# Patient Record
Sex: FEMALE | Race: WHITE
[De-identification: names, ages, dates, MRNs, and addresses within clinical notes are randomized per-mention and may not be internally consistent; named-entity substitution may affect disease eponyms.]

---

## 2019-11-20 ENCOUNTER — HOSPITAL ENCOUNTER (OUTPATIENT)
Dept: HOSPITAL 75 - RAD FS | Age: 34
End: 2019-11-20
Attending: NURSE PRACTITIONER
Payer: COMMERCIAL

## 2019-11-20 DIAGNOSIS — S62.636A: Primary | ICD-10-CM

## 2019-11-20 PROCEDURE — 73130 X-RAY EXAM OF HAND: CPT

## 2019-11-20 NOTE — DIAGNOSTIC IMAGING REPORT
INDICATION: 

Right hand injury with pain.



TECHNIQUE: 

AP, oblique, and lateral views of the right hand were obtained.



FINDINGS:

There is a mildly displaced intra-articular avulsion fracture

along the dorsal aspect of the fifth distal phalanx. There is

mild dorsal displacement of the fracture fragment. No other

fracture or malalignment is identified.



IMPRESSION: 

Mildly displaced dorsal avulsion fracture involving the 5th

distal phalanx adjacent to the distal interphalangeal joint.



Dictated by: 



  Dictated on workstation # [id number]

## 2019-11-22 NOTE — HISTORY AND PHYSICAL
DATE OF SERVICE:  11/27/2019



ADMISSION HISTORY AND PHYSICAL



DATE OF ADMISSION:

11/27/2019.



This will be for outpatient surgery on 11/27/2019 for right small finger

percutaneous pin fixation.



HISTORY OF PRESENT ILLNESS:

The patient is a 34-year-old right hand dominant female, who has had her left

small finger stepped on approximately a week ago.  She was found to have an

avulsion fracture of the distal phalanx of the small finger consistent with a

bony mallet.  Due to the large size of the fracture, it was recommended that the

patient undergo operative fixation.



REVIEW OF SYSTEMS:

No chest pain, no shortness of breath and no dysuria.



PAST MEDICAL HISTORY:

Anxiety disorder and exercise induced asthma.



PAST SURGICAL HISTORY:

[place] tooth extraction.



FAMILY HISTORY:

Noncontributory.



PRIMARY CARE PROVIDER:

Dr. Matta.



MEDICATIONS:

Sertraline.



ALLERGIES:

No known drug allergies.



SOCIAL HISTORY:

The patient denies alcohol and tobacco use.



RADIOGRAPHS:

Reveal a bony mallet fracture of the distal phalanx involving approximately 50%

of the articular surface.



PHYSICAL EXAMINATION:

GENERAL:  The patient is well developed, well-nourished, in no acute distress.

HEENT:  Normocephalic, atraumatic.  Pupils are equal, round and reactive to

light.  Oropharynx is clear.

NECK:  Supple, no lymphadenopathy.

LUNGS:  Clear to auscultation bilaterally.

HEART:  Regular rate and rhythm.

ABDOMEN:  Soft, nontender and nondistended.

EXTREMITIES:  The right small finger demonstrates tenderness dorsally at her DIP

joint.  She has an extension lag.  Sensation is intact distally.  She has intact

FDS and FDP function.



IMPRESSION:

Bony mallet avulsion fracture of the small finger distal phalanx.



PLAN:

Closed reduction and percutaneous pin fixation of the right small finger.  The

risks, benefits, options, ramifications and recovery were discussed at length

with the patient.  She understands and wishes to proceed.





Job ID: [id number]

DocumentID: [id number]

Dictated Date:  11/22/2019 11:04:53

Transcription Date: 11/22/2019 11:37:53

Dictated By: DICK HULL MD

## 2019-11-25 ENCOUNTER — HOSPITAL ENCOUNTER (OUTPATIENT)
Dept: HOSPITAL 75 - PREOP | Age: 34
Discharge: HOME | End: 2019-11-25
Attending: ORTHOPAEDIC SURGERY
Payer: COMMERCIAL

## 2019-11-25 VITALS — WEIGHT: 290.57 LBS | HEIGHT: 67.99 IN | BODY MASS INDEX: 44.04 KG/M2

## 2019-11-25 DIAGNOSIS — Z01.818: Primary | ICD-10-CM

## 2019-11-27 ENCOUNTER — HOSPITAL ENCOUNTER (OUTPATIENT)
Dept: HOSPITAL 75 - SDC | Age: 34
Discharge: HOME | End: 2019-11-27
Attending: ORTHOPAEDIC SURGERY
Payer: COMMERCIAL

## 2019-11-27 VITALS — DIASTOLIC BLOOD PRESSURE: 89 MMHG | SYSTOLIC BLOOD PRESSURE: 138 MMHG

## 2019-11-27 VITALS — DIASTOLIC BLOOD PRESSURE: 81 MMHG | SYSTOLIC BLOOD PRESSURE: 132 MMHG

## 2019-11-27 VITALS — SYSTOLIC BLOOD PRESSURE: 123 MMHG | DIASTOLIC BLOOD PRESSURE: 75 MMHG

## 2019-11-27 VITALS — DIASTOLIC BLOOD PRESSURE: 87 MMHG | SYSTOLIC BLOOD PRESSURE: 137 MMHG

## 2019-11-27 VITALS — SYSTOLIC BLOOD PRESSURE: 109 MMHG | DIASTOLIC BLOOD PRESSURE: 64 MMHG

## 2019-11-27 VITALS — HEIGHT: 67.99 IN | BODY MASS INDEX: 44.04 KG/M2 | WEIGHT: 290.57 LBS

## 2019-11-27 VITALS — DIASTOLIC BLOOD PRESSURE: 80 MMHG | SYSTOLIC BLOOD PRESSURE: 127 MMHG

## 2019-11-27 VITALS — SYSTOLIC BLOOD PRESSURE: 134 MMHG | DIASTOLIC BLOOD PRESSURE: 85 MMHG

## 2019-11-27 VITALS — SYSTOLIC BLOOD PRESSURE: 139 MMHG | DIASTOLIC BLOOD PRESSURE: 80 MMHG

## 2019-11-27 VITALS — SYSTOLIC BLOOD PRESSURE: 143 MMHG | DIASTOLIC BLOOD PRESSURE: 96 MMHG

## 2019-11-27 DIAGNOSIS — F32.9: ICD-10-CM

## 2019-11-27 DIAGNOSIS — F41.9: ICD-10-CM

## 2019-11-27 DIAGNOSIS — E66.01: ICD-10-CM

## 2019-11-27 DIAGNOSIS — J45.998: ICD-10-CM

## 2019-11-27 DIAGNOSIS — M20.011: Primary | ICD-10-CM

## 2019-11-27 PROCEDURE — 84703 CHORIONIC GONADOTROPIN ASSAY: CPT

## 2019-11-27 PROCEDURE — 87081 CULTURE SCREEN ONLY: CPT

## 2019-11-27 NOTE — PROGRESS NOTE-PRE OPERATIVE
Pre-Operative Progress Note


H&P Reviewed


The H&P was reviewed, patient examined and no changes noted.


Date Seen by Provider:  Nov 27, 2019


Time Seen by Provider:  08:34


Date H&P Reviewed:  Nov 27, 2019


Time H&P Reviewed:  07:15


Pre-Operative Diagnosis:  right small finger bony mallet finger











DICK HULL MD            Nov 27, 2019 09:32


POS

## 2019-11-27 NOTE — DIAGNOSTIC IMAGING REPORT
Fluoroscopy.



Indication:  Finger pain.



Fluoroscopic assistance was provided for Dr. May. 19.2 seconds

of fluoroscopy time was utilized. AP and lateral spot films of

the 5th digit were obtained. The right hand exam performed

11/20/2019 did note a mildly displaced avulsion fracture

involving the dorsal aspect of the base of the distal phalanx of

the 5th digit. On this exam there is now an orthopedic fixation

wire longitudinally traversing the distal phalanx and the middle

phalanx.



Impression:  Fluoroscopic assistance was provided for Dr. May.



Dictated by: 



  Dictated on workstation # [id number]

## 2019-11-27 NOTE — PROGRESS NOTE-POST OPERATIVE
Post-Operative Progess Note


Surgeon (s)/Assistant (s)


Surgeon


DICK HULL MD


Assistant:  Arthur segura





Pre-Operative Diagnosis


right small finger bony mallet finger





Post-Operative Diagnosis





right small finger bony mallet finger





Procedure & Operative Findings


Date of Procedure


11/27/19


Procedure Performed/Findings


closed reduction and percutaneous pin fixation of the right small finger DIP 

joint


Anesthesia Type


GETA





Estimated Blood Loss


Estimated blood loss (mL):  minimal





Specimens/Packing


Specimens Removed


none


Packing:  


none











DICK HULL MD            Nov 27, 2019 09:34


POS

## 2019-11-27 NOTE — OPERATIVE REPORT
DATE OF SERVICE:  11/27/2019



PREOPERATIVE DIAGNOSIS:

Right small finger bony mallet finger.



POSTOPERATIVE DIAGNOSIS:

Right small finger bony mallet finger.



PROCEDURES:

Closed reduction and percutaneous pin fixation of the right small finger DIP

joint.



SURGEON:

Gene Hull MD



ASSISTANT:

Arthur Bucio, who assisted throughout the procedure.



ANESTHESIA:

General endotracheal by Darnell Vann CRNA.



TOURNIQUET TIME:

Not applicable.



ESTIMATED BLOOD LOSS:

Minimal.



DRAINS:

None.



COMPLICATIONS:

None.



POSTOPERATIVE PLAN:

Pin fixation for four to six weeks.  The patient was transferred to the recovery

room awake and in stable condition.



STATEMENT OF MEDICAL NECESSITY:

The patient is a 34-year-old right hand dominant female, who sustained an injury

to her right small finger when she was unintentionally stepped on.  She was

found to have a bony mallet finger with weakness with extension of the DIP

joint.  This involved approximately 50% of the articular surface.  Therefore, it

was recommended that the patient undergo operative fixation.



DESCRIPTION OF PROCEDURE:

After risks and benefits of procedure were discussed and questions were

answered, an informed consent was signed and placed on the chart.  The operative

site was confirmed in the preoperative holding area initialed by the surgeon. 

The patient was then transferred to the operating room and after adequate levels

of general endotracheal anesthetic were obtained, a timeout was called,

confirming the operative site.  The right upper extremity was prepped and draped

in the usual sterile fashion.  Under fluoroscopic visualization, a single 0.45

K-wire was passed from the distal aspect of the distal phalanx longitudinally

across the DIP joint with the DIP joint held in hyperextension.  This was felt

to be in excellent position.  The pin was felt to be in excellent position on

the AP, lateral and oblique radiographs.  The joint was stable to stress.  The

pin was cut and the ball was applied.  The patient was transferred to the

recovery room awake and in stable condition.





Job ID: [id number]

DocumentID: [id number]

Dictated Date:  11/27/2019 09:33:01

Transcription Date: 11/27/2019 15:06:29

Dictated By: GENE HULL MD

## 2019-11-28 NOTE — ANESTHESIA-GENERAL POST-OP
General


Patient Condition


Mental Status/LOC:  Same as Preop


Cardiovascular:  Satisfactory


Nausea/Vomiting:  Absent


Respiratory:  Satisfactory


Pain:  Controlled


Complications:  Absent





Post Op Complications


Complications


None





Follow Up Care/Instructions


Patient Instructions


None needed.





Anesthesia/Patient Condition


Patient Condition


Patient is doing well, no complaints, stable vital signs, no apparent adverse 

anesthesia problems.   


No complications reported per nursing.


D/C home per [facility]:  Yes











CHACHA CAR CRNA           Nov 28, 2019 07:04


POS

## 2021-12-31 ENCOUNTER — HOSPITAL ENCOUNTER (OUTPATIENT)
Dept: HOSPITAL 75 - LAB FS | Age: 36
End: 2021-12-31
Attending: FAMILY MEDICINE
Payer: COMMERCIAL

## 2021-12-31 DIAGNOSIS — Z00.00: Primary | ICD-10-CM

## 2021-12-31 LAB
ALBUMIN SERPL-MCNC: 4.1 GM/DL (ref 3.2–4.5)
ALP SERPL-CCNC: 90 U/L (ref 40–136)
ALT SERPL-CCNC: 18 U/L (ref 0–55)
BILIRUB SERPL-MCNC: 0.7 MG/DL (ref 0.1–1)
BUN/CREAT SERPL: 17
CALCIUM SERPL-MCNC: 8.9 MG/DL (ref 8.5–10.1)
CHLORIDE SERPL-SCNC: 107 MMOL/L (ref 98–107)
CHOLEST SERPL-MCNC: 194 MG/DL (ref ?–200)
CO2 SERPL-SCNC: 21 MMOL/L (ref 21–32)
CREAT SERPL-MCNC: 0.52 MG/DL (ref 0.6–1.3)
GFR SERPLBLD BASED ON 1.73 SQ M-ARVRAT: 134 ML/MIN
GLUCOSE SERPL-MCNC: 113 MG/DL (ref 70–105)
HDLC SERPL-MCNC: 48 MG/DL (ref 40–60)
POTASSIUM SERPL-SCNC: 4 MMOL/L (ref 3.6–5)
PROT SERPL-MCNC: 6.8 GM/DL (ref 6.4–8.2)
SODIUM SERPL-SCNC: 138 MMOL/L (ref 135–145)
TRIGL SERPL-MCNC: 70 MG/DL (ref ?–150)
VLDLC SERPL CALC-MCNC: 14 MG/DL (ref 5–40)

## 2021-12-31 PROCEDURE — 36415 COLL VENOUS BLD VENIPUNCTURE: CPT

## 2021-12-31 PROCEDURE — 80053 COMPREHEN METABOLIC PANEL: CPT

## 2021-12-31 PROCEDURE — 84443 ASSAY THYROID STIM HORMONE: CPT

## 2021-12-31 PROCEDURE — 80061 LIPID PANEL: CPT
